# Patient Record
Sex: FEMALE | Race: WHITE
[De-identification: names, ages, dates, MRNs, and addresses within clinical notes are randomized per-mention and may not be internally consistent; named-entity substitution may affect disease eponyms.]

---

## 2017-07-16 ENCOUNTER — HOSPITAL ENCOUNTER (EMERGENCY)
Dept: HOSPITAL 80 - FED | Age: 57
Discharge: HOME | End: 2017-07-16
Payer: COMMERCIAL

## 2017-07-16 VITALS
SYSTOLIC BLOOD PRESSURE: 157 MMHG | TEMPERATURE: 98.1 F | OXYGEN SATURATION: 95 % | RESPIRATION RATE: 20 BRPM | DIASTOLIC BLOOD PRESSURE: 79 MMHG

## 2017-07-16 VITALS — HEART RATE: 69 BPM

## 2017-07-16 DIAGNOSIS — I10: ICD-10-CM

## 2017-07-16 DIAGNOSIS — Z79.82: ICD-10-CM

## 2017-07-16 DIAGNOSIS — S63.501A: Primary | ICD-10-CM
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
## 2017-07-16 DIAGNOSIS — Z87.891: ICD-10-CM

## 2017-07-16 NOTE — EDPHY
H & P


Stated Complaint: c/o pain/swelling in R lateral hand/wrist, no known inj/trauma


Time Seen by Provider: 07/16/17 21:54


HPI/ROS: 





Chief Complaint:  Right wrist pain





HPI:  56-year-old woman started developing right wrist pain earlier this 

afternoon.  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 

recall any specific injuries.  About 5 o'clock this evening started developing 

pain in the ulnar aspect of her right wrist which is continued to worsen.  

There is moderate swelling.  There is no redness. Patient has also recently 

been having some pain and 1 ankle in both of her knees.  She has an appoint 

with primary care physician on Friday to workup for rheumatologic process.  

Denies any fevers or chills.  No numbness or tingling weakness. She has 

decreased range of motion secondary to pain. No prior injuries.





ROS:  10 point Review of Systems is negative except as noted in the HPI.





PMH:  Atrial fibrillation, hypertension, osteoarthritis





Social History: [No] smoking, [no] alcohol, [ no recreational drug use]





Family History: [non-contributory]





Physical Exam:


General:  Awake, alert, no acute distress


Right upper extremity:  She has mild tenderness and swelling over the ulnar 

aspect of her wrist.  There is no focal bony tenderness.  She has decreased 

range of motion secondary to pain.  Is not erythematous.  Is not warm to the 

touch.  She is neurologically intact in the radial, median, and ulnar nerve 

distribution.  Capillary refills less than 3 seconds.


Skin:  No rash








- Medical/Surgical History


Hx Asthma: No


Hx Chronic Respiratory Disease: No


Hx Diabetes: No


Hx Cardiac Disease: Yes


Hx Renal Disease: No


Hx Cirrhosis: No


Hx Alcoholism: No


Hx HIV/AIDS: No


Hx Splenectomy or Spleen Trauma: No


Other PMH: hypertension, herpes, hysterectomy, orif R tib/fib, R rotator cuff 

surg





- Social History


Smoking Status: Former smoker


Constitutional: 





 Initial Vital Signs











Temperature (C)  37 C   07/16/17 21:05


 


Heart Rate  69   07/16/17 21:05


 


Respiratory Rate  16   07/16/17 21:05


 


Blood Pressure  134/93 H  07/16/17 21:05


 


O2 Sat (%)  97   07/16/17 21:05








 











O2 Delivery Mode               Room Air














Allergies/Adverse Reactions: 


 





codeine [Codeine] Adverse Reaction (Mild, Verified 07/16/17 21:11)


 GI








Home Medications: 














 Medication  Instructions  Recorded


 


Aspirin 325 mg (*)  07/16/17


 


Diltiazem  07/16/17


 


Losartan Potassium  07/16/17














Medical Decision Making





- Diagnostics


Imaging Results: 





 Imaging Impressions





Wrist X-Ray  07/16/17 21:28


Impression:  Negative right wrist radiographs. 











Imaging: I viewed and interpreted images myself


ED Course/Re-evaluation: 





X-rays are negative.  Patient is placed in a Velcro wrist splint.  Symptoms 

consistent with a soft tissue injury. There is no evidence of infection.  There 

is no bony injury on x-ray.  Patient has a plan to follow up with her primary 

care physician on Friday.  Will also give referral for Orthopedics if symptoms 

not improved.  She has analgesia available to her at home and is declining any 

further medication here.





Departure





- Departure


Disposition: Home, Routine, Self-Care


Clinical Impression: 


 Wrist sprain





Condition: Good


Instructions:  Wrist Sprain (ED)


Additional Instructions: 


Follow up with primary care physician as scheduled on Friday.


Return to the emergency depart for increasing pain, numbness or tingling, 

redness, fevers, chills, or any other concerns.


You may wear the Velcro wrist splint for comfort.


Ice for 15 minutes of every hour while awake for the next 3 days.


Referrals: 


Yenny Carreon MD [Primary Care Provider] - As per Instructions


Danyel Herrera MD [Medical Doctor] - As per Instructions

## 2017-12-18 ENCOUNTER — HOSPITAL ENCOUNTER (OUTPATIENT)
Dept: HOSPITAL 80 - FIMAGING | Age: 57
End: 2017-12-18
Attending: FAMILY MEDICINE
Payer: COMMERCIAL

## 2017-12-18 DIAGNOSIS — Z12.31: Primary | ICD-10-CM

## 2017-12-18 DIAGNOSIS — Z80.3: ICD-10-CM

## 2017-12-18 PROCEDURE — G0202 SCR MAMMO BI INCL CAD: HCPCS

## 2018-03-28 NOTE — GHP
[f rep st]



                                                       PREOP HISTORY AND PHYSICAL





DATE OF ADMISSION:  04/10/2018



PROBLEM:  Right knee arthritis.



HISTORY OF PRESENT ILLNESS:  The patient is a 57-year-old woman admitted for a right total knee arthr
oplasty.  Over 20 years ago, she sustained a right tibia fracture.  This was treated with intramedull
anjana nails.  Over the past few years, she has gone on to develop significant medial compartment degene
rative arthritis.  She has tried viscosupplementation injections and cortisone injections.  She has n
ow failed nonsurgical treatment.



PAST MEDICAL HISTORY:  She is treated for hypertension and a nonlethal cardiac arrhythmia.  She has f
requent PVCs.  No history of stents, DVT, hepatitis, or sleep apnea.



CURRENT MEDICATIONS:  Diltiazem  mg tablet daily.  Losartan 100 mg daily.



DRUG ALLERGIES:  Codeine causes nausea.  Metal allergy:  None.  Latex allergy:  None.



SOCIAL HISTORY:  She is .  She works in the computer business.  She does not smoke cigarettes 
and occasionally drinks alcohol.



PHYSICAL EXAMINATION:  GENERAL:  She is a thin, fit appearing woman.  EYES:  Conjunctivae and sclerae
 are clear.  Pupils are round and reactive.  MOUTH:  Good oral hygiene.  No loose teeth.  CHEST:  Claudio
ar.  HEART:  Regular rhythm.  No murmurs.  EXTREMITIES:  Pertinent findings limited to her right knee
.  She has a small effusion.  She is very tender along the medial joint line.  She lacks 10 degrees o
f full extension and flexes to 105 degrees.  She has mild pseudolaxity of her medial collateral ligam
ent.



IMAGING:  Films show advanced medial compartment degenerative arthritis.  She also has some calcifica
tion in her lateral meniscus.



IMPRESSION ON ADMISSION:  

1.  Right knee advanced medial compartment degenerative arthritis.  She has failed nonsurgical treatm
ent.  She is prepared for a right total knee arthroplasty.

2.  Nonlethal cardiac arrhythmia.

3.  Treatment for hypertension.

4.  Left knee degenerative arthritis, status post anterior cruciate ligament reconstruction.



PLAN:  She will undergo a right total knee arthroplasty.  The surgery has been described to her, incl
uding the risks, complications, expectations, and recovery time.  I have stressed the importance of p
ostoperative physical therapy.  I have notified her that about 15% of people with total knee replacem
ent surgery do not get a satisfactory result.  All her questions have been answered, and she consents
 to surgery.





Job #:  [id number]

## 2018-04-06 ENCOUNTER — HOSPITAL ENCOUNTER (EMERGENCY)
Dept: HOSPITAL 80 - FED | Age: 58
Discharge: HOME | End: 2018-04-06
Payer: COMMERCIAL

## 2018-04-06 VITALS — DIASTOLIC BLOOD PRESSURE: 82 MMHG | SYSTOLIC BLOOD PRESSURE: 146 MMHG
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
## 2018-04-06 DIAGNOSIS — Z87.891: ICD-10-CM
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
## 2018-04-06 DIAGNOSIS — Z23: ICD-10-CM
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
## 2018-04-06 DIAGNOSIS — S61.211A: Primary | ICD-10-CM

## 2018-04-06 DIAGNOSIS — I10: ICD-10-CM

## 2018-04-06 DIAGNOSIS — Z79.82: ICD-10-CM

## 2018-04-06 PROCEDURE — 0HQGXZZ REPAIR LEFT HAND SKIN, EXTERNAL APPROACH: ICD-10-PCS

## 2018-04-06 NOTE — EDPHY
General


Time Seen by Provider: 04/06/18 17:23


Narrative: 





CHIEF COMPLAINT: 


Finger laceration





HISTORY OF PRESENT ILLNESS: 


Patient presents with complaints of left finger laceration.  She was trying to 

open dog treats with a knife when it slipped, cutting her left index finger.  

She sustained laceration to the pad of the left index finger.  This happened 

around 11:00 a.m. Today.  She did irrigated.  No difficulty bending or 

straightening the finger.  Bleeding stopped with simple pressure.  No numbness, 

tingling, weakness or difficulty moving the finger.  Her tetanus is out-of-

date.  She is scheduled to have total knee arthroplasty on Tuesday with Dr. Guadarrama.  No other associated complaints or modifying factors.  Right-hand 

dominant.





TIME OF INJURY:


11:00 a.m. Today





TETANUS STATUS:


Greater than 10 years ago





MEDICAL/SURGICAL/SOCIAL HISTORY:


Hypertension.  Osteoarthritis with right total knee replacement scheduled for 

Tuesday





REVIEW OF SYSTEMS:


Ten systems reviewed and are negative unless otherwise noted in the HPI





EXAMINATION


General Appearance:  Alert, no distress


Head: normocephalic, atraumatic


Cardiovascular:  Symmetric radial pulses 2+.  Brisk cap refill in the fingers 

of the left hand.


Neurological:  A&O, 2 point sensation is intact in the affected finger.   

strength symmetric.  Interossei strength.


Skin:  Warm and dry, no rash.  1.25 cm laceration on the left index finger over 

the distal phalanx volar pad.  No bleeding.  No foreign body.  No evidence of 

flexor tendon injury.  Neurovascular intact distally.


Extremities:  Tender over the area laceration on the left index finger.  Full 

flexion extension of the left index finger including the isolation DIP joint.





DIFFERENTIAL DIAGNOSES:


Including but not limited to laceration, complex laceration, laceration with 

foreign body, laceration with tendon injury








MDM:


5:35 p.m.


Simple laceration of the left index finger on the pad of the distal phalanx.  

She is neurovascular intact with no evidence of injury to the flexor tendon 

structures.  Her tetanus is out-of-date we will update.  She did wait 6 hr to 

come in and has surgery on Tuesday, thus I will treat her with Keflex 

prophylaxis.  She is well-appearing and nontoxic with good signs of perfusion 

to the affected finger.  I have administered a digital block and we will 

proceed with irrigation closure.  No indication for x-ray.





6:35 p.m.


Laceration has been repaired without difficulty.  Excellent approximation of 

the wound borders.  Sterile dressing applied.  We discussed daily wound care.  

We discussed Keflex prophylaxis, 1st dose given here.  We discussed ED 

precautions for signs of infection should they develop.  Return here in 7-10 

days for suture removal.  She is comfortable this plan and discharged home 

stable condition








PROCEDURE: Laceration repair


Consent:  Verbal


Location:  Left index finger, distal phalanx, volar pad


Length of repair:  1.25 cm


Complexity:  Simple


Layer involvement:  Single


Anesthesia:  Digital block


Irrigation:  Extensive


Debridement:  None


Procedure description:  Following good anesthesia, the wound was copiously 

irrigated.  Wound bed was explored with a sterile glove, and there is no 

foreign body noted.  Wound borders were approximated well with good hemostasis.

  Tolerated well without complication.


Suture/Staple material:  5-0 Ethilon,


Wound care:  Routine as discussed


Suture/Staple removal: 7-10  Days





PROCEDURE: Digital Block


Indication: Finger laceration


Consent:  Verbal


Location:  Left index finger


Anesthesia: Lidocaine 1% plain, 0.25% Marcaine plain, 5mL


Description:  Base of the finger was prepped.  The above was infused without 

difficulty.  Tolerated well.  Good anesthesia.


Complications: None








SUPERVISION:


This patient was independently evaluated without direct involvement of or 

examination by the attending physician. 





ED Precautions: 


Worsening pain. Erythema, edema, cyanosis, pallor, paresthesia or anesthesia.











- History


Smoking Status: Former smoker





- Objective


Vital Signs: 


 Initial Vital Signs











Temperature (C)  98.2 F   04/06/18 17:01


 


Heart Rate  73   04/06/18 17:01


 


Respiratory Rate  16   04/06/18 17:01


 


Blood Pressure  146/82 H  04/06/18 17:01


 


O2 Sat (%)  98   04/06/18 17:01








 











O2 Delivery Mode               Room Air














Allergies/Adverse Reactions: 


 





oxycodone Allergy (Verified 03/21/18 15:28)


 makes me sick


codeine [Codeine] Adverse Reaction (Verified 03/21/18 15:28)


 makes me sick








Home Medications: 














 Medication  Instructions  Recorded


 


Aspirin [Aspirin 81mg (*)] 81 mg PO HS 07/16/17


 


Diltiazem HCl [Cartia Xt] 120 mg PO HS 07/16/17


 


Losartan Potassium [Cozaar 50 mg 100 mg PO DAILY 07/16/17





(*)]  


 


Acetaminophen [Tylenol 325mg (*)] 325 mg PO DAILY PRN 03/21/18


 


Cholecalciferol Vit D3 [Vitamin D3 1,000 units PO BID 03/21/18





(*)]  


 


Fluticasone Nasal [Flonase Nasal 1 sprays NASAL DAILY 03/21/18





Spray (RX)]  


 


Herbals/Supplements -Info Only 1 ea PO DAILY 03/21/18


 


Acyclovir  03/26/18


 


E String Vaginal Insert  03/26/18


 


Cephalexin [Keflex (*)] 500 mg PO QID #40 cap 04/06/18











Medications Given: 


 








Discontinued Medications





Cephalexin HCl (Keflex)  500 mg PO EDNOW ONE


   PRN Reason: Protocol


   Stop: 04/06/18 17:36


   Last Admin: 04/06/18 17:40 Dose:  500 mg


Diphtheria/Tetanus/Acell Pertussis (Boostrix)  0.5 ml IM .ONCE ONE


   Stop: 04/06/18 17:34


   Last Admin: 04/06/18 17:40 Dose:  0.5 ml








Departure





- Departure


Disposition: Home, Routine, Self-Care


Clinical Impression: 


Laceration of left index finger without damage to nail


Qualifiers:


 Encounter type: initial encounter Foreign body presence: without foreign body 

Qualified Code(s): S61.211A - Laceration without foreign body of left index 

finger without damage to nail, initial encounter





Condition: Good


Instructions:  Care For Your Stitches (ED), Laceration (ED)


Additional Instructions: 


1. Daily wound care as discussed


2. You may leave the current dressing in place for up to 48 hr


3. Keflex prophylaxis as prescribed to completion


4. ED precautions for changes in the wound appearance as discussed


5. Return here for stitches to be removed in 7-10 days


Referrals: 


Yenny Carreon MD [Primary Care Provider] - As per Instructions


Prescriptions: 


Cephalexin [Keflex (*)] 500 mg PO QID #40 cap

## 2018-04-10 ENCOUNTER — HOSPITAL ENCOUNTER (OUTPATIENT)
Dept: HOSPITAL 80 - F3N | Age: 58
Setting detail: OBSERVATION
LOS: 1 days | Discharge: HOME | End: 2018-04-11
Attending: ORTHOPAEDIC SURGERY | Admitting: ORTHOPAEDIC SURGERY
Payer: COMMERCIAL

## 2018-04-10 DIAGNOSIS — Z87.891: ICD-10-CM

## 2018-04-10 DIAGNOSIS — I10: ICD-10-CM

## 2018-04-10 DIAGNOSIS — M17.11: Primary | ICD-10-CM

## 2018-04-10 DIAGNOSIS — I49.9: ICD-10-CM

## 2018-04-10 PROCEDURE — G0378 HOSPITAL OBSERVATION PER HR: HCPCS

## 2018-04-10 PROCEDURE — 97161 PT EVAL LOW COMPLEX 20 MIN: CPT

## 2018-04-10 PROCEDURE — 27447 TOTAL KNEE ARTHROPLASTY: CPT

## 2018-04-10 PROCEDURE — 97110 THERAPEUTIC EXERCISES: CPT

## 2018-04-10 PROCEDURE — 0SRC0J9 REPLACEMENT OF RIGHT KNEE JOINT WITH SYNTHETIC SUBSTITUTE, CEMENTED, OPEN APPROACH: ICD-10-PCS | Performed by: ORTHOPAEDIC SURGERY

## 2018-04-10 PROCEDURE — 97116 GAIT TRAINING THERAPY: CPT

## 2018-04-10 PROCEDURE — 97165 OT EVAL LOW COMPLEX 30 MIN: CPT

## 2018-04-10 PROCEDURE — 73560 X-RAY EXAM OF KNEE 1 OR 2: CPT

## 2018-04-10 PROCEDURE — C1713 ANCHOR/SCREW BN/BN,TIS/BN: HCPCS

## 2018-04-10 PROCEDURE — 77073 BONE LENGTH STUDIES: CPT

## 2018-04-10 RX ADMIN — KETOROLAC TROMETHAMINE PRN MG: 30 INJECTION, SOLUTION INTRAMUSCULAR at 21:51

## 2018-04-10 RX ADMIN — TRANEXAMIC ACID SCH MG: 650 TABLET ORAL at 14:46

## 2018-04-10 RX ADMIN — ASPIRIN SCH MG: 325 TABLET, FILM COATED ORAL at 21:52

## 2018-04-10 RX ADMIN — ACETAMINOPHEN SCH MG: 325 TABLET ORAL at 18:54

## 2018-04-10 RX ADMIN — FAMOTIDINE SCH MG: 20 TABLET, FILM COATED ORAL at 21:52

## 2018-04-10 RX ADMIN — HYDROCODONE BITARTRATE AND ACETAMINOPHEN PRN TAB: 5; 325 TABLET ORAL at 11:47

## 2018-04-10 RX ADMIN — TRANEXAMIC ACID SCH MG: 650 TABLET ORAL at 23:26

## 2018-04-10 RX ADMIN — Medication SCH MLS: at 21:53

## 2018-04-10 RX ADMIN — ACETAMINOPHEN SCH: 325 TABLET ORAL at 12:22

## 2018-04-10 RX ADMIN — DOCUSATE SODIUM AND SENNOSIDES SCH TAB: 50; 8.6 TABLET ORAL at 21:51

## 2018-04-10 RX ADMIN — Medication SCH MLS: at 14:46

## 2018-04-10 RX ADMIN — HYDROCODONE BITARTRATE AND ACETAMINOPHEN PRN TAB: 5; 325 TABLET ORAL at 10:40

## 2018-04-10 RX ADMIN — KETOROLAC TROMETHAMINE PRN MG: 30 INJECTION, SOLUTION INTRAMUSCULAR at 14:45

## 2018-04-10 NOTE — GOP
[f rep st]



                                                                OPERATIVE REPORT





DATE OF OPERATION:  04/10/2018



SURGEON:  Ron Guadarrama MD



ASSISTANT:  Cristian Rosario, [facility] and Jose Carlos Kevin, PAC.



ANESTHESIA:  Combination of Marcaine, spinal, IV sedation, and adductor canal block.



ANESTHESIOLOGIST:  Hoang Vu MD.



PREOPERATIVE DIAGNOSIS:  Right knee severe degenerative arthritis.



POSTOPERATIVE DIAGNOSIS:  Right knee severe degenerative arthritis.



PROCEDURE PERFORMED:  Right total knee arthroplasty, cemented, Smith and Nephew Journey II, posterior
 stabilized.



FINDINGS:  





DESCRIPTION OF PROCEDURE:  The patient was given 2 g of IV Ancef preoperatively within 60 minutes of 
surgery.  She also received IV tranexamic acid at a dose of 10 mg/kg.  She was placed on the operatin
g room table and given spinal anesthesia with Marcaine by Dr. Vu.  She was then placed supine a
nd given IV sedation.  A Rosenbaum catheter was not used.  A CAROL ANN stocking and SCD were applied to the non
operative leg.  A bolster was placed under her right hip to prevent excessive external rotation of th
e leg.  Her right lower extremity was prepped with ChloraPrep from the upper thigh tourniquet to the 
tips of the toes.  It was draped free using sterile sheets, stockinette, and Ioban plastic adhesive d
rape.  The lower leg was wrapped with compressive Coban.  The leg was exsanguinated with elevation an
d a 6-inch compressive wrap, and the pneumatic tourniquet was inflated to 250 mmHg. 

The [employer] time-out was performed to verify the correct patient identity and the c
orrect surgical side and site.  The [place] time-out was also performed. 



The DeMayo leg holding device was sterilely attached to the operating room table and used throughout 
the procedure to help position the knee.  A straight midline incision was made centered on the patell
a.  Subcutaneous tissues were sharply divided, and hemostasis was obtained using electrocautery.  A m
edial subcutaneous flap was developed, and the capsule and synovium were opened in medial parapatella
r fashion.  Extensive degenerative changes were present in her patellofemoral joint and medial compar
tment. Two-thirds of her patella was eroded down to subchondral bone.  The medial capsule and periost
eum were elevated off the rim of the medial tibial plateau all the way around to the posteromedial co
rner.  Her medial collateral ligament was released enough to balance the medial side of the knee and 
correct the mild varus deformity. 



In order to improve exposure, her patella was prepared first.  The original thickness of the patella 
was measured.  Peripheral osteophytes were removed.  I cut a flat surface on the back of the patella.
  Her patella was sized for a 35 mm round resurfacing component.  I removed enough bone from the franco
lla such that the remaining bone plus the thickness of the patellar component recreated the original 
thickness of the patella.  The composite thickness was 23 mm. 



The intramedullary alignment guide system was used to set up the distal femoral cut.  The distal femu
r was cut in 6 degrees of valgus.  Because of a 10 degree preoperative flexion contracture, I made a 
+2 mm cut on the distal femur.  The sizing jig was used to determine proper femoral sizing.  I shifte
d the jig anteriorly 1 mm in order to accommodate a size 6 femoral component without notching the ant
erior cortex.  The 5 in 1 cutting block was applied, and the anterior and posterior condylar cuts and
 chamfer cuts were made.  The final jig was used to remove the central portion of the distal femur to
 accommodate the posterior  stabilized femoral component.  I was careful to determine proper rotation
 by referencing off Whitesides line and other bony landmarks.  Each cut was checked for accuracy befo
re and after it was made.  Her femur was sized for a size 6 posterior stabilized component. 



Next, the tibia was prepared.  The proximal tibial cut was made using the extramedullary alignment gu
nia system.  The cut was made in a few degrees of posterior slope.  I was careful to achieve proper v
arus valgus alignment and proper rotation.  The posterior compartment was cleared of meniscal remnant
s.  Osteophytes were removed from the back of her femoral condyles.  I checked the flexion and extens
ion gaps, and they were equal and balanced and rectangular.  The tibia was sized for a size 5 compone
nt.  With the trial components in place, I selected a 9 mm polyethylene posterior stabilized tibial i
nsert.  The knee came to full extension and flexed to 130 degrees.  There was no overstuffing in flex
ion.  Her collateral ligaments were stable and balanced in 90 degrees of flexion and full extension. 
 The trial patellar button was applied, and patellar tracking was checked.  She had a mild tendency f
or lateral tilt and subluxation.  I did a mild lateral release.  After that, tracking was excellent w
ithout any digital pressure. 



40 mL of the joint anesthetic cocktail were injected into the posterior capsule, the periarticular st
ructures, the quadriceps muscle and tendon areas, and the subcutaneous tissues along the skin edges. 
 A second dose of IV tranexamic acid was given at a dose of 10 mg/kg. 



The surfaces were prepared for cementing.  They were carefully cleaned with the pulsating lavage irri
gation and thoroughly dried.  A CarboJet device was used to blow dry the cancellous surfaces.  A doub
le batch of methylmethacrylate cement with 2 g of powdered vancomycin was added.  While it was still 
in a doughy state, all 3 components were cemented in place.  Excess cement was removed before it hard
ened. 



The 9 mm trial tibial insert was re-tried and was the proper thickness.  The actual component was ins
erted and locked into place.  The knee was thoroughly irrigated 1 final time with a dilute Betadine s
olution. 



The tourniquet was deflated and the total tourniquet time was 56 minutes. 



The vastus medialis portion of the extensor mechanism was repaired with several interrupted figure-of
-eight #2 FiberWire sutures.  The posterior capsule and synovium were closed first with multiple inte
rrupted figure-of-eight 0 PDS sutures, followed by a running #2 barbed Ethicon Stratafix PDO suture. 
 The subcutaneous tissues were closed with a running 0 barbed Ethicon Stratafix Monoderm suture.  The
 skin was closed with a running 3-0 barbed Ethicon Stratafix Monoderm subcuticular suture.  The skin 
was sealed with half-inch Steri-Strips.  The wound was covered with a large Mepilex waterproof dressi
ng and a 6-inch compressive wrap.  A long-leg CAROL ANN stocking and SCD were applied, followed by the cool
ing device. 



I used a size 6 cemented Smith and Nephew Oxinium posterior stabilized femoral component, a size 5 ce
mented tibial base plate, a 9 mm posterior stabilized tibial insert and a 35 mm cemented round all-po
lyethylene resurfacing patellar component. 



The estimated blood loss following inflation of the tourniquet was about 100 cc. 



The sponge and needle count were correct on 2 occasions. 

The patient was awakened from anesthesia, transferred to [facility] [place] and taken to PACU in sat
isfactory condition.  There were no recognized intraoperative complications.  In the PACU, for additi
onal postoperative pain control, Dr. Vu performed an adductor canal block with an indwelling ca
theter. 



Cristian Rosario and Robin Kevin acted as surgical assistants.  Their assistance was a medical necess
ity for safe completion of the procedure.



Copy requested to:

Yenny Carreon MD



Job #:  [id number]

## 2018-04-10 NOTE — PDANEPAE
ANE History of Present Illness


R knee OA








ANE Past Medical History





- Cardiovascular History


Hx Hypertension: Yes


Hx Arrhythmias: Yes


Hx Chest Pain: No


Hx Coronary Artery / Peripheral Vascular Disease: No


Hx CHF / Valvular Disease: No


Hx Palpitations: No


Cardiovascular History Comment: htn.  hx of pvc's.  hx of atrial tachycardia.  

disorganized arrythmia





- Pulmonary History


Hx COPD: No


Hx Asthma/Reactive Airway Disease: No


Hx Recent Upper Respiratory Infection: No


Hx Oxygen in Use at Home: No


Hx Sleep Apnea: No


Sleep Apnea Screening Result - Last Documented: Negative


Pulmonary History Comment: seasonal allergies- uses flonase





- Neurologic History


Hx Cerebrovascular Accident: No


Hx Seizures: No


Hx Dementia: No





- Endocrine History


Hx Diabetes: No





- Renal History


Hx Renal Disorders: No





- Liver History


Hx Hepatic Disorders: No





- Neurological & Psychiatric Hx


Hx Neurological and Psychiatric Disorders: No


Neurological / Psychiatric History Comment: MIGRAINES 3 PER YEAR





- Cancer History


Hx Cancer: No





- Congenital Disorder History


Hx Congenital Disorders: No





- GI History


Hx Gastrointestinal Disorders: No





- Other Health History


Other Health History: wears glasses.  no acl to left knee





- Chronic Pain History


Chronic Pain: Yes (bilateral knees)





- Surgical History


Prior Surgeries: right big toe repair 08/2017.  right shoulder surgery 5/2014.  

RT TIB FIB ORIF WITH POST HARDWARE REMVL.  LT KNEE ACL.  HYSTERECTOMY.  





ANE Review of Systems


Review of systems is: negative


Review of Systems: 








- Exercise capacity


Exercise capacity: >=4 METS


METS (RN): 4 METS





ANE Patient History





- Allergies


Allergies/Adverse Reactions: 








codeine [Codeine] Allergy (Verified 04/09/18 14:41)


 makes me sick


oxycodone Allergy (Verified 03/21/18 15:28)


 makes me sick








- Home Medications


Home Medications: 








Aspirin [Aspirin 81mg (*)] 81 mg PO HS 07/16/17 [Last Taken 2 Weeks Ago ~03/27/ 18]


Diltiazem HCl [Cartia Xt] 120 mg PO HS 07/16/17 [Last Taken 04/09/18 19:00]


Losartan Potassium [Cozaar 50 mg (*)] 100 mg PO DAILY 07/16/17 [Last Taken 04/09 /18 19:00]


Acetaminophen [Tylenol 325mg (*)] 325 mg PO DAILY PRN 03/21/18 [Last Taken 04/07 /18]


Cholecalciferol Vit D3 [Vitamin D3 (*)] 1,000 units PO BID 03/21/18 [Last Taken 

04/06/18]


Fluticasone Nasal [Flonase Nasal Spray (RX)] 1 sprays NASAL DAILY 03/21/18 [

Last Taken 04/09/18]


Herbals/Supplements -Info Only 1 ea PO DAILY 03/21/18 [Last Taken 04/06/18]


Acyclovir  03/26/18 [Last Taken 1 Month Ago ~03/10/18]


E String Vaginal Insert  03/26/18 [Last Taken 04/10/18]


Iron 28 mg 04/10/18 [Last Taken 04/09/18]








- NPO status


NPO Status: no food or drink >8 hours


NPO Since - Liquids (Date): 04/10/18


NPO Since - Liquids (Time): 03:30


NPO Since - Solids (Date): 04/09/18


NPO Since - Solids (Time): 20:00





- Anes Hx


Anes Hx: no prior problems





- Smoking Hx


Smoking Status: Former smoker





- Alcohol Use


Alcohol Use: Occasionally





- Family Anes Hx


Family Anes Hx: none


Family Hx Anesthesia Complications: none





ANE Labs/Vital Signs





- Vital Signs


Vital Signs: reviewed preoperatively; see RN documention for details


Blood Pressure: 113/78


Heart Rate: 57


Respiratory Rate: 16


O2 Sat (%): 95


Height: 172.72 cm


Weight: 63.503 kg





ANE Physical Exam





- Airway


Neck exam: FROM


Mallampati Score: Class 1





- Pulmonary


Pulmonary: no respiratory distress





- Cardiovascular


Cardiovascular: regular rate and rhythym





- ASA Status


ASA Status: II





ANE Anesthesia Plan


Anesthesia Plan: spinal


Regional Anesthesia: adductor canal FNB

## 2018-04-10 NOTE — PDHPUP
History & Physical Update


H&P update statement: 


This history and physical update is based on an assessment of the patient which 

was completed after admission or registration (within 24 hours), but prior to 

the surgery/procedure.





H&P update: H&P reviewed & patient examined

## 2018-04-10 NOTE — POSTOPPROG
Post Op Note


Date of Operation: 04/11/18


Surgeon: Ron Guadarrama


Assistant: Donald


Post-op Diagnosis: Right knee degenerative arthritis


Procedure: Right total knee arthroplasty


Inf/Abcess present in the surg proc area at time of surgery?: No


EBL:

## 2018-04-10 NOTE — POSTOPPROG
Post Op Note


Date of Operation: 04/10/18


Surgeon: Ron Guadarrama


Assistant: Donald


Anesthesiologist: Dr. Hoang Vu


Anesthesia: IV Sedation, Spinal


Post-op Diagnosis: Right knee severe degenerative arthritis


Procedure: Right total knee arthroplasty


Inf/Abcess present in the surg proc area at time of surgery?: No


EBL:  (Adductor canal block with catheter in PACU)

## 2018-04-11 VITALS — SYSTOLIC BLOOD PRESSURE: 117 MMHG | DIASTOLIC BLOOD PRESSURE: 65 MMHG

## 2018-04-11 RX ADMIN — TRANEXAMIC ACID SCH MG: 650 TABLET ORAL at 08:21

## 2018-04-11 RX ADMIN — KETOROLAC TROMETHAMINE PRN MG: 30 INJECTION, SOLUTION INTRAMUSCULAR at 04:44

## 2018-04-11 RX ADMIN — FAMOTIDINE SCH MG: 20 TABLET, FILM COATED ORAL at 08:24

## 2018-04-11 RX ADMIN — ASPIRIN SCH MG: 325 TABLET, FILM COATED ORAL at 08:23

## 2018-04-11 RX ADMIN — ACETAMINOPHEN SCH MG: 325 TABLET ORAL at 11:51

## 2018-04-11 RX ADMIN — DOCUSATE SODIUM AND SENNOSIDES SCH TAB: 50; 8.6 TABLET ORAL at 08:21

## 2018-04-11 RX ADMIN — ACETAMINOPHEN SCH MG: 325 TABLET ORAL at 04:43

## 2018-04-11 RX ADMIN — ACETAMINOPHEN SCH: 325 TABLET ORAL at 00:33

## 2018-04-11 NOTE — ASMTCMCOM
CM Note

 

CM Note                       

Notes:

Pt admitted for right knee severe degenerative arthritis; s/p right total knee arthroplasty. Pt is 

 and works in the [employer]. PT is recommending outpt rehab and use of a front wheel 


walker. OT eval is still pending. Discharge needs remain unclear at this time. CM will continue to 

follow.



Current Discharge Plan: To be determined

 

Date Signed:  04/11/2018 11:53 AM

Electronically Signed By:Hetal Malloy RN

## 2018-04-11 NOTE — SOAPPROG
SOAP Progress Note


Assessment/Plan: 


Assessment:





Afebrile.  Min pain so far.


Up and walking and cleared on stairs.


H/H is good.


Films look excellent.




















Plan:


Redose ACB then remove catheter.


DC today.


04/11/18 11:46





Objective: 





 Vital Signs











Temp Pulse Resp BP Pulse Ox


 


 36.9 C   72   16   142/69 H  98 


 


 04/11/18 08:00  04/11/18 08:00  04/11/18 08:00  04/11/18 08:23  04/11/18 08:00








 Laboratory Results





 04/11/18 04:40 





 











 04/10/18 04/11/18 04/12/18





 05:59 05:59 05:59


 


Intake Total  1175 


 


Output Total  50 


 


Balance  1125 














ICD10 Worksheet


Patient Problems: 


 Problems











Problem Status Onset


 


Osteoarthritis of right knee Acute  


 


Laceration of left index finger without damage to nail Acute

## 2018-04-11 NOTE — GDS
[f rep st]



                                                             DISCHARGE SUMMARY





ADMISSION DIAGNOSIS:  Right knee severe degenerative arthritis.



DISCHARGE DIAGNOSIS:  Right knee severe degenerative arthritis.



OPERATION PERFORMED:  04/10/2018, a right total knee arthroplasty.



POSTOPERATIVE COMPLICATIONS:  None.



CONDITION ON DISCHARGE:  Improved.



DESCRIPTION OF HOSPITAL COURSE:  The patient was admitted to the hospital the on the morning of surge
ry.  Her admission CBC was normal.  The same day, under a combination of Marcaine, spinal, IV sedatio
n, and adductor canal block she underwent a right total knee arthroplasty.  Postoperatively, she was 
treated with multimodal DVT prophylaxis, including early mobilization and aspirin.  On the first post
operative day, her hemoglobin and hematocrit were 11.3 and 33.5.  She was seen by Physical Therapy an
d made good progress with ambulation and stairs.  Her adductor canal block catheter was redosed on th
e first postoperative day, and then removed.



DISPOSITION:  The patient is discharged to her home.  She will go to outpatient physical therapy.  Jewels underwood has prescriptions for Norco, Celebrex, tramadol and Zofran.  She will continue aspirin 325 mg p.o. 
daily for 21 days.  Progress to full weightbearing on the right as tolerated.  I will see her back in
 the office on 04/23/2018.  If there any problems, she is to call me at the office.





Job #:  [id number]

## 2018-04-11 NOTE — POSTANESTH
Post Anesthetic Evaluation


Cardiovascular Status: Normal, Stable


Respiratory Status: Normal, Stable


Level of Consciousness/Mental Status: Can Participate in Eval


Pain Control: Adequate, Prn Tx Ordered


Nausea/Vomiting Control: Adequate, Prn Tx Ordered


Complications Possibly Related to Anesthesia: None Noted (pt seen on floor, ACB 

cath dosed with 20mL 0.5% bupiv after neg asp. cath removed)

## 2018-09-28 ENCOUNTER — HOSPITAL ENCOUNTER (OUTPATIENT)
Dept: HOSPITAL 80 - FIMAGING | Age: 58
End: 2018-09-28
Attending: ORTHOPAEDIC SURGERY
Payer: COMMERCIAL

## 2018-09-28 DIAGNOSIS — M17.12: ICD-10-CM

## 2018-09-28 DIAGNOSIS — M23.42: ICD-10-CM

## 2018-09-28 DIAGNOSIS — Z01.818: Primary | ICD-10-CM

## 2018-10-26 ENCOUNTER — HOSPITAL ENCOUNTER (OUTPATIENT)
Dept: HOSPITAL 80 - F3N | Age: 58
Setting detail: OBSERVATION
LOS: 1 days | Discharge: HOME | End: 2018-10-27
Attending: ORTHOPAEDIC SURGERY | Admitting: ORTHOPAEDIC SURGERY
Payer: COMMERCIAL

## 2018-10-26 DIAGNOSIS — Z96.651: ICD-10-CM

## 2018-10-26 DIAGNOSIS — M17.12: Primary | ICD-10-CM

## 2018-10-26 DIAGNOSIS — D62: ICD-10-CM

## 2018-10-26 PROCEDURE — 73560 X-RAY EXAM OF KNEE 1 OR 2: CPT

## 2018-10-26 PROCEDURE — G0378 HOSPITAL OBSERVATION PER HR: HCPCS

## 2018-10-26 PROCEDURE — 97161 PT EVAL LOW COMPLEX 20 MIN: CPT

## 2018-10-26 PROCEDURE — 27447 TOTAL KNEE ARTHROPLASTY: CPT

## 2018-10-26 RX ADMIN — ASPIRIN 81 MG SCH MG: 81 TABLET ORAL at 19:48

## 2018-10-26 RX ADMIN — Medication SCH MLS: at 19:49

## 2018-10-26 RX ADMIN — FAMOTIDINE SCH MG: 20 TABLET, FILM COATED ORAL at 19:48

## 2018-10-26 RX ADMIN — DOCUSATE SODIUM AND SENNOSIDES SCH TAB: 50; 8.6 TABLET ORAL at 19:48

## 2018-10-26 NOTE — POSTOPPROG
Post Op Note


Date of Operation: 10/26/18


Surgeon: FANTASMA Lucia


Assistant: dang lucia


Anesthesiologist: dr. hastings


Anesthesia: Spinal, Other (Specify) (adductor canal block)


Pre-op Diagnosis: left knee OA


Post-op Diagnosis: same


Indication: left knee pain


Procedure: L TKA robot assisted and sensor assisted


Findings: severe knee OA


Inf/Abcess present in the surg proc area at time of surgery?: No


EBL:

## 2018-10-26 NOTE — PDANEPAE
ANE History of Present Illness





57 year old female for left knee arthroplasty.





ANE Past Medical History





- Cardiovascular History


Hx Hypertension: Yes


Hx Arrhythmias: Yes


Hx Chest Pain: No


Hx Coronary Artery / Peripheral Vascular Disease: No


Hx CHF / Valvular Disease: No


Hx Palpitations: No


Cardiovascular History Comment: pvc's





- Pulmonary History


Hx COPD: No


Hx Asthma/Reactive Airway Disease: No


Hx Recent Upper Respiratory Infection: No


Hx Oxygen in Use at Home: No


Hx Sleep Apnea: No


Pulmonary History Comment: seasonal allergies- uses flonase





- Neurologic History


Hx Cerebrovascular Accident: No


Hx Seizures: No


Hx Dementia: No





- Endocrine History


Hx Diabetes: No





- Renal History


Hx Renal Disorders: No





- Liver History


Hx Hepatic Disorders: No





- Neurological & Psychiatric Hx


Hx Neurological and Psychiatric Disorders: No


Neurological / Psychiatric History Comment: MIGRAINES 3 PER YEAR





- Cancer History


Hx Cancer: No





- Congenital Disorder History


Hx Congenital Disorders: No





- GI History


Hx Gastrointestinal Disorders: No





- Other Health History


Other Health History: wears glasses.  no acl to left knee





- Chronic Pain History


Chronic Pain: Yes (bilateral knees)





- Surgical History


Prior Surgeries: RT TOTAL KNEE 4/2018.  right big toe repair 08/2017.  right 

shoulder surgery 5/2014.  RT TIB FIB ORIF WITH POST HARDWARE REMVL.  LT KNEE 

ACL.  HYSTERECTOMY.  





ANE Review of Systems


Review of systems is: negative


Review of Systems: 








- Exercise capacity


METS (RN): 4 METS





ANE Patient History





- Allergies


Allergies/Adverse Reactions: 








codeine [Codeine] Allergy (Verified 04/09/18 14:41)


 makes me sick


oxycodone Allergy (Verified 03/21/18 15:28)


 makes me sick








- Home Medications


Home Medications: 








Diltiazem HCl [Cartia Xt] 120 mg PO DAILY 07/16/17 [Last Taken 04/09/18 19:00]


Losartan Potassium [Cozaar 50 mg (*)] 100 mg PO DAILY 07/16/17 [Last Taken 04/09 /18 19:00]


Cholecalciferol Vit D3 [Vitamin D3 (*)] 1,000 units PO BID 03/21/18 [Last Taken 

04/06/18]


Fluticasone Nasal [Flonase Nasal Spray] 1 sprays NASAL DAILY PRN 03/21/18 [Last 

Taken 04/09/18]


Herbals/Supplements -Info Only 1 ea PO DAILY 03/21/18 [Last Taken 04/06/18]


Estring  1 each VG Q90D 04/10/18 [Last Taken Unknown]


Ferrous Sulfate [Ferrous Sulf 325 MG (*)] 325 mg PO DAILY 04/10/18 [Last Taken 

04/09/18]


Acetaminophen [Tylenol 325mg (*)] 650 mg PO Q6HRS PRN 09/18/18 [Last Taken 

Unknown]


Aspirin [Aspirin 81mg (*)] 81 mg PO HS 09/18/18 [Last Taken Unknown]


valACYclovir [Valtrex (*)] 500 mg PO BID PRN 09/18/18 [Last Taken Unknown]








- Smoking Hx


Smoking Status: Former smoker





- Family Anes Hx


Family Hx Anesthesia Complications: none





ANE Labs/Vital Signs





- Vital Signs


Height: 172.72 cm


Weight: 63.503 kg





ANE Physical Exam





- Airway


Neck exam: FROM


Mallampati Score: Class 2


Mouth exam: normal dental/mouth exam





- Pulmonary


Pulmonary: no respiratory distress





- Cardiovascular


Cardiovascular: regular rate and rhythym





- ASA Status


ASA Status: II





ANE Anesthesia Plan


Anesthesia Plan: spinal

## 2018-10-27 VITALS — SYSTOLIC BLOOD PRESSURE: 133 MMHG | DIASTOLIC BLOOD PRESSURE: 66 MMHG

## 2018-10-27 RX ADMIN — Medication SCH MLS: at 03:59

## 2018-10-27 RX ADMIN — DOCUSATE SODIUM AND SENNOSIDES SCH TAB: 50; 8.6 TABLET ORAL at 09:58

## 2018-10-27 RX ADMIN — ASPIRIN 81 MG SCH MG: 81 TABLET ORAL at 09:59

## 2018-10-27 RX ADMIN — FAMOTIDINE SCH MG: 20 TABLET, FILM COATED ORAL at 09:59

## 2018-10-27 NOTE — GOP
DATE OF OPERATION:  10/26/2018



SURGEON:  KANIKA Desouza MD



ASSISTANT:  EDIN Dueñas



ANESTHESIA:  Spinal.



PREOPERATIVE DIAGNOSIS:  Left knee osteoarthritis.



POSTOPERATIVE DIAGNOSIS:  Left knee osteoarthritis.



PROCEDURE PERFORMED:  Left total knee arthroplasty with computer navigation, robotic assist.



FINDINGS:  





ESTIMATED BLOOD LOSS:  30 cc.



INDICATIONS:  The patient is a 57-year-old female with severe and progressive pain and deformity of t
he left knee unresponsive to conservative care.  The risks and benefits of surgical intervention were
 explained in detail.



DESCRIPTION OF PROCEDURE:  The patient was brought to the operative room and placed on the table in t
he supine position.  Spinal anesthesia was induced without difficulty.  A pneumatic tourniquet was ap
plied about the left proximal thigh, and the leg was prepped and draped in a sterile fashion.  The le
g [place] was applied. After exsanguination by elevation the tourniquet was inflated to 250 mmHg. 



Incision was made anterior medial from the tibial tuberosity to a point 2 cm proximal to the superior
 pole of the patella.  Medial parapatellar arthrotomy was carried out from the superior pole of the p
atella and posteriorly in line with the fibers of the Type II VMO.  The medial collateral ligament wa
s elevated and the infrapatellar fat pad was resected. 



The patella was everted and the articular surface was excised.  A 35 mm patellar button was placed.  




Attention was turned first to the distal aspect of the femur.  After exposure of the femur, 2 half pi
ns were placed for fixation of the femoral array.  In a similar fashion, 2 pins were placed anteromed
ial on the tibia for fixation of the tibial array.  External land marking and registration of the hip
 center was performed without difficulty.  Internal femoral and tibial registration was carried out w
ithout difficulty and the femoral and tibial checkpoints were placed and verified for accuracy. 



Attention was turned to the femur.  The foot print for the size 4 femoral component was cut with the 
saw using the [employer] robotic system and verified for accuracy against the CT based plan.  In a similar f
ashion, the saw was used to cut the footprint for the size 4 tibial component using the [employer] system an
d verified for accuracy against the CT based plan. The tibial articular surface was excised without d
ifficulty, followed by the intercondylar box cut. 



The knee was extended and the remnants of the medial and lateral meniscus were excised. The posterior
 capsule was injected with ropivacaine, epinephrine and Toradol.  A size 4 tibial tray was positioned
.  Trial reduction was then carried out.  There was excellent range of motion, alignment, and stabili
ty using the 4 x 9 mm polyethylene. 



All trials were then removed. The joint was thoroughly irrigated and carefully dried. The press-fit c
omponents were implanted. The permanent 4 x 9 mm polyethylene was placed without difficulty.  Also, p
roximal tibia ACL screw. 



The tourniquet was deflated and all bleeders were coagulated.  The wound was thoroughly irrigated and
 closed using interrupted sutures of 2-0 Vicryl for the joint capsule.  The subcu was closed with 3-0
 Vicryl and the skin with 4-0 Monocryl.  Dermabond and Steri-Strips were applied followed by a compre
ssive dressing.  The patient was then moved from the operating room to the recovery room in good cond
ition, having tolerated the procedure well.



PATHOLOGY:  Severe medial patellofemoral osteoarthritis.





Job #:  [id number]

## 2018-10-27 NOTE — SOAPPROG
SOAP Progress Note


Assessment/Plan: 


Assessment:





Patient is doing well POD 1 s/p L TKA


pain is well controlled on oral pain meds despite multiple intolerances to 

medications.


VTE ppx: recommend ASA 81 mg BID for 4 weeks


Anemia: level is expected initially postop.  continue to monitor closely


D/c planning: pending release from PT patient may discharge to home today.




















Plan:





10/27/18 09:52





Subjective: 





patient is doing well,denies SOB ,chest pain and N/V


Objective: 





 Vital Signs











Temp Pulse Resp BP Pulse Ox


 


 37.1 C   73   12   133/66 H  96 


 


 10/27/18 08:00  10/27/18 08:00  10/27/18 08:00  10/27/18 08:00  10/27/18 08:00








 Laboratory Results





 10/27/18 04:43 





 











 10/26/18 10/27/18 10/28/18





 05:59 05:59 05:59


 


Intake Total  1145 550


 


Output Total  2450 700


 


Balance  -1305 -150








LLE; incision dressing is clean and dry, NVI, +pf/df





ICD10 Worksheet


Patient Problems: 


 Problems











Problem Status Onset


 


Primary localized osteoarthritis of left knee Acute  


 


Laceration of left index finger without damage to nail Acute  


 


Osteoarthritis of right knee Acute

## 2019-01-08 ENCOUNTER — HOSPITAL ENCOUNTER (OUTPATIENT)
Dept: HOSPITAL 80 - FIMAGING | Age: 59
End: 2019-01-08
Attending: FAMILY MEDICINE
Payer: COMMERCIAL

## 2019-01-08 DIAGNOSIS — Z12.31: Primary | ICD-10-CM
